# Patient Record
Sex: FEMALE | Race: WHITE
[De-identification: names, ages, dates, MRNs, and addresses within clinical notes are randomized per-mention and may not be internally consistent; named-entity substitution may affect disease eponyms.]

---

## 2019-06-19 ENCOUNTER — RECORDS - HEALTHEAST (OUTPATIENT)
Dept: ADMINISTRATIVE | Facility: OTHER | Age: 44
End: 2019-06-19

## 2019-07-02 ENCOUNTER — RECORDS - HEALTHEAST (OUTPATIENT)
Dept: ADMINISTRATIVE | Facility: OTHER | Age: 44
End: 2019-07-02

## 2019-07-03 ENCOUNTER — RECORDS - HEALTHEAST (OUTPATIENT)
Dept: ADMINISTRATIVE | Facility: OTHER | Age: 44
End: 2019-07-03

## 2019-07-05 ENCOUNTER — RECORDS - HEALTHEAST (OUTPATIENT)
Dept: ADMINISTRATIVE | Facility: OTHER | Age: 44
End: 2019-07-05

## 2019-07-05 ENCOUNTER — ANESTHESIA - HEALTHEAST (OUTPATIENT)
Dept: SURGERY | Facility: HOSPITAL | Age: 44
End: 2019-07-05

## 2019-07-05 ENCOUNTER — HOSPITAL ENCOUNTER (OUTPATIENT)
Dept: CT IMAGING | Facility: HOSPITAL | Age: 44
Discharge: HOME | End: 2019-07-05
Attending: INTERNAL MEDICINE

## 2019-07-05 ENCOUNTER — COMMUNICATION - HEALTHEAST (OUTPATIENT)
Dept: TELEHEALTH | Facility: CLINIC | Age: 44
End: 2019-07-05

## 2019-07-05 DIAGNOSIS — D25.9 UTERINE FIBROID: ICD-10-CM

## 2019-07-10 ENCOUNTER — RECORDS - HEALTHEAST (OUTPATIENT)
Dept: ADMINISTRATIVE | Facility: OTHER | Age: 44
End: 2019-07-10

## 2019-07-16 ENCOUNTER — HOSPITAL ENCOUNTER (OUTPATIENT)
Dept: CARDIOLOGY | Facility: HOSPITAL | Age: 44
Discharge: HOME | End: 2019-07-16
Attending: INTERNAL MEDICINE

## 2019-07-16 DIAGNOSIS — I31.39 PERICARDIAL EFFUSION: ICD-10-CM

## 2019-07-16 LAB
AORTIC ROOT: 3 CM
ASCENDING AORTA: 3.1 CM
DOP CALC LVOT AREA: 2.54 CM2
DOP CALC LVOT DIAMETER: 1.8 CM
DOP CALC LVOT PEAK VEL: 90.9 CM/S
DOP CALC LVOT STROKE VOLUME: 53.7 CM3
DOP CALC MV VTI: 26.2 CM
DOP CALCLVOT PEAK VEL VTI: 21.1 CM
ECHO EJECTION FRACTION ESTIMATED: 60 %
FRACTIONAL SHORTENING: 40.6 % (ref 28–44)
INTERVENTRICULAR SEPTUM IN END DIASTOLE: 0.97 CM (ref 0.6–0.9)
IVS/PW RATIO: 1.2
LA AREA 1: 17 CM2
LA AREA 2: 20 CM2
LEFT ATRIUM LENGTH: 4.4 CM
LEFT ATRIUM SIZE: 3.3 CM
LEFT ATRIUM VOLUME: 65.7 ML
LEFT VENTRICULAR INTERNAL DIMENSION IN DIASTOLE: 5.15 CM (ref 3.8–5.2)
LEFT VENTRICULAR INTERNAL DIMENSION IN SYSTOLE: 3.06 CM (ref 2.2–3.5)
LEFT VENTRICULAR MASS: 163.6 G
LEFT VENTRICULAR OUTFLOW TRACT MEAN GRADIENT: 2 MMHG
LEFT VENTRICULAR OUTFLOW TRACT MEAN VELOCITY: 64.9 CM/S
LEFT VENTRICULAR OUTFLOW TRACT PEAK GRADIENT: 3 MMHG
LEFT VENTRICULAR POSTERIOR WALL IN END DIASTOLE: 0.8 CM (ref 0.6–0.9)
MITRAL VALVE DECELERATION SLOPE: 4460 MM/S2
MITRAL VALVE E/A RATIO: 1.1
MITRAL VALVE MEAN INFLOW VELOCITY: 62.4 CM/S
MITRAL VALVE PEAK VELOCITY: 98.3 CM/S
MITRAL VALVE PRESSURE HALF-TIME: 59 MS
MV AREA VTI: 2.05 CM2
MV AVERAGE E/E' RATIO: 8.9 CM/S
MV DECELERATION TIME: 197 MS
MV E'TISSUE VEL-LAT: 10.4 CM/S
MV E'TISSUE VEL-MED: 8.7 CM/S
MV LATERAL E/E' RATIO: 8.2
MV MEAN GRADIENT: 2 MMHG
MV MEDIAL E/E' RATIO: 9.8
MV PEAK A VELOCITY: 77.1 CM/S
MV PEAK E VELOCITY: 85.4 CM/S
MV PEAK GRADIENT: 3.9 MMHG
MV VALVE AREA BY CONTINUITY EQUATION: 2 CM2
MV VALVE AREA PRESSURE 1/2 METHOD: 3.7 CM2
TRICUSPID REGURGITATION PEAK PRESSURE GRADIENT: 23.6 MMHG
TRICUSPID VALVE ANULAR PLANE SYSTOLIC EXCURSION: 2.3 CM
TRICUSPID VALVE PEAK REGURGITANT VELOCITY: 243 CM/S

## 2019-08-06 ENCOUNTER — OFFICE VISIT - HEALTHEAST (OUTPATIENT)
Dept: CARDIOLOGY | Facility: CLINIC | Age: 44
End: 2019-08-06

## 2019-08-06 DIAGNOSIS — I31.39 PERICARDIAL EFFUSION: ICD-10-CM

## 2019-08-06 DIAGNOSIS — J84.81 LYMPHANGIOLEIOMYOMATOSIS (H): ICD-10-CM

## 2019-08-06 ASSESSMENT — MIFFLIN-ST. JEOR: SCORE: 1492.29

## 2019-08-12 ENCOUNTER — ANESTHESIA - HEALTHEAST (OUTPATIENT)
Dept: SURGERY | Facility: HOSPITAL | Age: 44
End: 2019-08-12

## 2019-08-12 ENCOUNTER — SURGERY - HEALTHEAST (OUTPATIENT)
Dept: SURGERY | Facility: HOSPITAL | Age: 44
End: 2019-08-12

## 2019-08-12 ASSESSMENT — MIFFLIN-ST. JEOR: SCORE: 1514.97

## 2021-05-31 NOTE — PROGRESS NOTES
CARDIOLOGY CLINIC CONSULT NOTE     Assessment/Plan:   1.  Small to medium sized pericardial effusion, without evidence of tamponade physiology.  No intervention is warranted.  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  2.  Orthostatic lightheadedness with occasional syncope.  Encouraged maintenance of adequate hydration status.       History of Present Illness:     It is my pleasure to see Lucila Garcia at the [facility] clinic for evaluation of pericardial effusion, preoperatively.    Lucila Garcia is a 44 y.o. female with a past medical history of lymphangioleiomyomatosis.  She has been followed for this condition for a number of years and has had stable fluid collections noted in the retroperitoneum, and mesentery.  She is noted to have a pericardial effusion on CT performed preoperatively for hysterectomy.  She is referred for further assessment.    An echocardiogram showed a small to medium sized pericardial effusion with no evidence of cardiac tamponade.  Otherwise normal LV and RV function was noted.    She has had no symptoms of chest pains, but she has had problems with orthostatic hypotension, including recurrent syncopal episodes associated with rising quickly in the setting of dehydration.  She is also in the past had problems with hypoxemia associated with infections.  Over the last 17 years, she has at times had repeated thoracentesis, what sounds like a pericardiocentesis, and has had bilateral pleurodesis performed to prevent recurrent pleural effusions.  She has had none of these interventions in the last 10 years or so.    She is active but pays attention to maintaining adequate hydration.  She does yoga including hot yoga with no problems.    Past Medical History:   There is no problem list on file for this patient.      Past Surgical History:     Past Surgical History:   Procedure Laterality Date     BACK SURGERY      spinal fusion     CHEST SURGERY         Family  "History:   History reviewed. No pertinent family history.  Family history reviewed and is not pertinent to the chief complaint or presenting problem    Social History:    reports that she has never smoked. She has never used smokeless tobacco. She reports that she drinks alcohol. She reports that she has current or past drug history.    Exercise: Yoga, Hot yoga    Sleep: Restorative    Meds:     Current Outpatient Medications   Medication Sig Dispense Refill     FLUoxetine (PROZAC) 20 MG capsule Take 20 mg by mouth daily.       No current facility-administered medications for this visit.        Allergies:   Patient has no known allergies.    Review of Systems:     General: Weight Loss  Eyes: WNL  Ears/Nose/Throat: WNL  Lungs: Shortness of Breath  Heart: Shortness of Breath with activity  Stomach: WNL  Bladder: WNL  Muscle/Joints: WNL  Skin: WNL  Nervous System: WNL  Mental Health: Depression, Anxiety     Blood: Easy Bruising        Objective:      Physical Exam  175 lb (79.4 kg)  5' 8\" (1.727 m)  Body mass index is 26.61 kg/m .  /68 (Patient Site: Left Arm, Patient Position: Sitting, Cuff Size: Adult Large)   Pulse 71   Resp 16   Ht 5' 8\" (1.727 m)   Wt 175 lb (79.4 kg)   BMI 26.61 kg/m    Right arm 132/80 with no paradoxical pulse  Left arm 130/82 with no paradoxical pulse    General Appearance : Awake, Alert, No acute distress  HEENT: No Scleral icterus; the mucous membranes were pink and moist.  Conjunctivae not injected  Neck:  No cervical bruits, jugular venous distention, or thyromegaly   Chest: The spine was straight  Lungs: Respirations unlabored; the lungs are clear to auscultation.  No wheezing   Cardiovascular: Nonpalpable point of maximal impulse. auscultation reveals normal first and second heart sounds with no murmurs, rubs, or gallops.  Carotid, radial, and dorsalis pedal pulses are intact and symmetric.  Abdomen: No organomegaly, masses, bruits, or tenderness. Bowels sounds are " present  Extremities: No edema  Skin: No xanthelasma. Warm, Dry.  Musculoskeletal: No tenderness.  Neurologic: Alert and oriented ×3.        CT abdomen pelvis 7/5/2019:  CONCLUSION:   1.  Enlarged uterus with multiple presumed leiomyomatous lesions which are increased in size and number since 2014. The largest of these measures up to 5.8 cm. At minimum, short interval follow-up is recommended.  2.  Stable large masslike fluid attenuation structure extending throughout the retroperitoneum and mesentery with multiple small satellite fluid density structures. Given the patient's history of LEE, this could reflect sequelae of lymphatic/venule   obstruction. No change since 2014 favors benignity.  3.  Stable extensive osteolytic changes throughout the visualized osseous structures which can also be seen with LEE.  4.  Newly visualized 4 to 6 mm right lower lobe subpleural solid pulmonary nodules. These can be followed per guidelines below.  Moderate-sized pericardial effusion.    Cardiac Imaging Studies:  Echocardiogram 7/16/2019:    No previous study for comparison.    Normal left ventricular size and systolic function.    Left ventricle ejection fraction is normal. The estimated left ventricular ejection fraction is 60%.    Normal right ventricular size and systolic function.    Small to medium sized pericardial effusion. No cardiac tamponade is present.    Lab Review   No results found for: NA, K, CL, CO2, BUN, CREATININE, GLUCOSE, CALCIUM  No results found for: WBC, HGB, HCT, MCV, PLT  No results found for: CHOL, TRIG, HDL, LDLCALC  No results found for: TROPONINI  No results found for: BNP  No results found for: TSH    Sourav Story MD [facility]    His note created using Dragon voice recognition software. Sound alike errors may have escaped editing.

## 2021-05-31 NOTE — ANESTHESIA PREPROCEDURE EVALUATION
Anesthesia Evaluation      Patient summary reviewed   History of anesthetic complications     Airway   Mallampati: II   Pulmonary - normal exam   (+) shortness of breath,     ROS comment: H/O pleural effusion                         Cardiovascular - normal exam  ECG reviewed     ROS comment: Small - moderate pericardial effusion on echo  EF 60%     Neuro/Psych - negative ROS     Endo/Other - negative ROS      GI/Hepatic/Renal    (+) GERD,        Other findings: Lymphangioleiomyomatosis  Hb 12.3  PONV      Dental - normal exam                        Anesthesia Plan  Planned anesthetic: general endotracheal  Ketamine 30 mg IV, Mg 4 gm for post-op pain control  ASA 3   Induction: intravenous   Anesthetic plan and risks discussed with: patient    Post-op plan: routine recovery

## 2021-05-31 NOTE — ANESTHESIA CARE TRANSFER NOTE
Last vitals:   Vitals:    08/12/19 1717   BP: 125/69   Pulse: 88   Resp: 18   Temp: 36.4  C (97.5  F)   SpO2: 100%     Patient's level of consciousness is drowsy  Spontaneous respirations: yes  Maintains airway independently: yes  Dentition unchanged: yes  Oropharynx: oropharynx clear of all foreign objects    QCDR Measures:  ASA# 20 - Surgical Safety Checklist: WHO surgical safety checklist completed prior to induction    PQRS# 430 - Adult PONV Prevention: 4558F - Pt received => 2 anti-emetic agents (different classes) preop & intraop  ASA# 8 - Peds PONV Prevention: NA - Not pediatric patient, not GA or 2 or more risk factors NOT present  PQRS# 424 - Kathya-op Temp Management: 4559F - At least one body temp DOCUMENTED => 35.5C or 95.9F within required timeframe  PQRS# 426 - PACU Transfer Protocol: - Transfer of care checklist used  ASA# 14 - Acute Post-op Pain: ASA14B - Patient did NOT experience pain >= 7 out of 10

## 2021-05-31 NOTE — ANESTHESIA POSTPROCEDURE EVALUATION
Patient: Lucila Garcia  ROBOTIC TOTAL LAPAROSCOPIC HYSTERECTOMY, BILATERAL SALPINGECTOMY, CYSTOSCOPY  Anesthesia type: general    Patient location: PACU  Last vitals:   Vitals Value Taken Time   /66 8/12/2019  6:15 PM   Temp 36.5  C (97.7  F) 8/12/2019  6:15 PM   Pulse 76 8/12/2019  6:15 PM   Resp 18 8/12/2019  6:15 PM   SpO2 93 % 8/12/2019  6:15 PM     Post vital signs: stable  Level of consciousness: awake, alert and responds to simple questions  Post-anesthesia pain: pain controlled  Post-anesthesia nausea and vomiting: yes, treated, resolved  Pulmonary: unassisted, return to baseline  Cardiovascular: stable and blood pressure at baseline  Hydration: adequate  Anesthetic events: no    QCDR Measures:  ASA# 11 - Kathya-op Cardiac Arrest: ASA11B - Patient did NOT experience unanticipated cardiac arrest  ASA# 12 - Kathya-op Mortality Rate: ASA12B - Patient did NOT die  ASA# 13 - PACU Re-Intubation Rate: ASA13B - Patient did NOT require a new airway mgmt  ASA# 10 - Composite Anes Safety: ASA10A - No serious adverse event    Additional Notes:

## 2021-06-03 VITALS — BODY MASS INDEX: 26.52 KG/M2 | WEIGHT: 175 LBS | HEIGHT: 68 IN

## 2021-06-03 VITALS — WEIGHT: 180 LBS | BODY MASS INDEX: 27.28 KG/M2 | HEIGHT: 68 IN

## 2021-06-03 VITALS — HEIGHT: 68 IN | BODY MASS INDEX: 25.85 KG/M2

## 2021-06-03 VITALS — WEIGHT: 173 LBS | BODY MASS INDEX: 26.3 KG/M2

## 2021-09-11 ENCOUNTER — HEALTH MAINTENANCE LETTER (OUTPATIENT)
Age: 46
End: 2021-09-11

## 2022-04-23 ENCOUNTER — HEALTH MAINTENANCE LETTER (OUTPATIENT)
Age: 47
End: 2022-04-23

## 2022-10-30 ENCOUNTER — HEALTH MAINTENANCE LETTER (OUTPATIENT)
Age: 47
End: 2022-10-30

## 2023-06-01 ENCOUNTER — HEALTH MAINTENANCE LETTER (OUTPATIENT)
Age: 48
End: 2023-06-01

## 2023-11-12 ENCOUNTER — HEALTH MAINTENANCE LETTER (OUTPATIENT)
Age: 48
End: 2023-11-12